# Patient Record
Sex: MALE | Race: WHITE | NOT HISPANIC OR LATINO | Employment: FULL TIME | URBAN - METROPOLITAN AREA
[De-identification: names, ages, dates, MRNs, and addresses within clinical notes are randomized per-mention and may not be internally consistent; named-entity substitution may affect disease eponyms.]

---

## 2024-04-15 ENCOUNTER — OFFICE VISIT (OUTPATIENT)
Dept: INTERNAL MEDICINE | Facility: CLINIC | Age: 29
End: 2024-04-15
Payer: COMMERCIAL

## 2024-04-15 VITALS
SYSTOLIC BLOOD PRESSURE: 116 MMHG | BODY MASS INDEX: 22.91 KG/M2 | OXYGEN SATURATION: 95 % | TEMPERATURE: 98 F | WEIGHT: 145.94 LBS | HEIGHT: 67 IN | HEART RATE: 95 BPM | DIASTOLIC BLOOD PRESSURE: 82 MMHG

## 2024-04-15 DIAGNOSIS — L71.8 OCULAR ROSACEA: ICD-10-CM

## 2024-04-15 DIAGNOSIS — Z00.00 ROUTINE GENERAL MEDICAL EXAMINATION AT A HEALTH CARE FACILITY: Primary | ICD-10-CM

## 2024-04-15 DIAGNOSIS — Z23 NEED FOR DIPHTHERIA-TETANUS-PERTUSSIS (TDAP) VACCINE: ICD-10-CM

## 2024-04-15 PROBLEM — L71.9 ROSACEA: Status: ACTIVE | Noted: 2024-04-15

## 2024-04-15 PROCEDURE — 90471 IMMUNIZATION ADMIN: CPT | Mod: S$GLB,,, | Performed by: INTERNAL MEDICINE

## 2024-04-15 PROCEDURE — 99385 PREV VISIT NEW AGE 18-39: CPT | Mod: 25,S$GLB,, | Performed by: INTERNAL MEDICINE

## 2024-04-15 PROCEDURE — 99999 PR PBB SHADOW E&M-NEW PATIENT-LVL IV: CPT | Mod: PBBFAC,,, | Performed by: INTERNAL MEDICINE

## 2024-04-15 PROCEDURE — 90715 TDAP VACCINE 7 YRS/> IM: CPT | Mod: S$GLB,,, | Performed by: INTERNAL MEDICINE

## 2024-04-15 RX ORDER — LIFITEGRAST 50 MG/ML
SOLUTION/ DROPS OPHTHALMIC 2 TIMES DAILY
COMMUNITY

## 2024-04-15 NOTE — PROGRESS NOTES
"Subjective:      Patient ID: Juan Antonio Barillas is a 29 y.o. male.    Chief Complaint: Establish Care    HPI      29 y.o. with  Patient Active Problem List   Diagnosis    Ocular rosacea    Routine general medical examination at a health care facility     Past Medical History:   Diagnosis Date    Anxiety     Ocular rosacea        Here today for annual prev exam.  Compliant with meds without significant side effects. Energy and appetite are good.     Has several moles that he would like checked.         No past surgical history on file.  Social History     Socioeconomic History    Marital status: Single   Tobacco Use    Smoking status: Never    Smokeless tobacco: Never     family history includes Diabetes in his father and mother; Heart disease in his paternal grandfather; Stroke in his paternal grandmother.  Review of Systems   Constitutional:  Negative for chills and fever.   HENT:  Negative for ear pain and sore throat.    Respiratory:  Negative for cough.    Cardiovascular:  Negative for chest pain.   Gastrointestinal:  Negative for abdominal pain and blood in stool.   Genitourinary:  Negative for dysuria and hematuria.   Neurological:  Negative for seizures and syncope.     Objective:   /82 (BP Location: Left arm, Patient Position: Sitting, BP Method: Large (Manual))   Pulse 95   Temp 97.5 °F (36.4 °C) (Tympanic)   Ht 5' 7" (1.702 m)   Wt 66.2 kg (145 lb 15.1 oz)   SpO2 95%   BMI 22.86 kg/m²     Physical Exam  Constitutional:       General: He is not in acute distress.     Appearance: He is well-developed.   HENT:      Head: Normocephalic and atraumatic.   Eyes:      Extraocular Movements: Extraocular movements intact.   Neck:      Thyroid: No thyromegaly.   Cardiovascular:      Rate and Rhythm: Normal rate and regular rhythm.   Pulmonary:      Breath sounds: Normal breath sounds. No wheezing or rales.   Abdominal:      General: Bowel sounds are normal.      Palpations: Abdomen is soft.      Tenderness: " "There is no abdominal tenderness.   Musculoskeletal:         General: No swelling.      Cervical back: Neck supple. No rigidity.   Lymphadenopathy:      Cervical: No cervical adenopathy.   Skin:     General: Skin is warm and dry.   Neurological:      Mental Status: He is alert and oriented to person, place, and time.   Psychiatric:         Behavior: Behavior normal.         No results found for: "WBC", "HGB", "HCT", "MCV", "PLT", "CHOL", "TRIG", "HDL", "LDLCALC", "ALT", "AST", "NA", "K", "CALCIUM", "CL", "CO2", "BUN", "CREATININE", "EGFRNORACEVR", "TSH", "PSA", "PSADIAG", "GLU", "HGBA1C", "MICROALBUR", "[id number]", "TOTALTESTOST", "BNP"       The ASCVD Risk score (Devon DK, et al., 2019) failed to calculate for the following reasons:    The 2019 ASCVD risk score is only valid for ages 40 to 79     Assessment:     1. Routine general medical examination at a health care facility    2. Ocular rosacea    3. Need for diphtheria-tetanus-pertussis (Tdap) vaccine      Plan:   1. Routine general medical examination at a health care facility  Overview:  Heart healthy diet, regular exercise, and regular use of sunscreen.    reviewed      Orders:  -     Comprehensive Metabolic Panel; Future; Expected date: 04/15/2024  -     CBC Auto Differential; Future; Expected date: 04/15/2024  -     TSH; Future; Expected date: 04/15/2024  -     Lipid Panel; Future; Expected date: 04/15/2024  -     Hepatitis C Antibody; Future; Expected date: 04/15/2024  -     HIV 1/2 Ag/Ab (4th Gen); Future; Expected date: 04/15/2024  -     Hemoglobin A1C; Future; Expected date: 04/15/2024  -     Ambulatory referral/consult to Dermatology; Future; Expected date: 04/22/2024    2. Ocular rosacea    3. Need for diphtheria-tetanus-pertussis (Tdap) vaccine  -     Tdap vaccine injection 0.5 mL        There are no Patient Instructions on file for this visit.    No future appointments.        Follow up in about 1 year (around 4/15/2025), or if symptoms worsen " or fail to improve, for fasting labs sturday.

## 2024-04-16 ENCOUNTER — LAB VISIT (OUTPATIENT)
Dept: LAB | Facility: HOSPITAL | Age: 29
End: 2024-04-16
Attending: INTERNAL MEDICINE
Payer: COMMERCIAL

## 2024-04-16 DIAGNOSIS — Z00.00 ROUTINE GENERAL MEDICAL EXAMINATION AT A HEALTH CARE FACILITY: ICD-10-CM

## 2024-04-16 LAB
ALBUMIN SERPL BCP-MCNC: 4.6 G/DL (ref 3.5–5.2)
ALP SERPL-CCNC: 78 U/L (ref 55–135)
ALT SERPL W/O P-5'-P-CCNC: 24 U/L (ref 10–44)
ANION GAP SERPL CALC-SCNC: 8 MMOL/L (ref 8–16)
AST SERPL-CCNC: 19 U/L (ref 10–40)
BASOPHILS # BLD AUTO: 0.04 K/UL (ref 0–0.2)
BASOPHILS NFR BLD: 0.5 % (ref 0–1.9)
BILIRUB SERPL-MCNC: 0.6 MG/DL (ref 0.1–1)
BUN SERPL-MCNC: 10 MG/DL (ref 6–20)
CALCIUM SERPL-MCNC: 9.8 MG/DL (ref 8.7–10.5)
CHLORIDE SERPL-SCNC: 106 MMOL/L (ref 95–110)
CHOLEST SERPL-MCNC: 220 MG/DL (ref 120–199)
CHOLEST/HDLC SERPL: 5.9 {RATIO} (ref 2–5)
CO2 SERPL-SCNC: 26 MMOL/L (ref 23–29)
CREAT SERPL-MCNC: 0.8 MG/DL (ref 0.5–1.4)
DIFFERENTIAL METHOD BLD: NORMAL
EOSINOPHIL # BLD AUTO: 0.1 K/UL (ref 0–0.5)
EOSINOPHIL NFR BLD: 1.1 % (ref 0–8)
ERYTHROCYTE [DISTWIDTH] IN BLOOD BY AUTOMATED COUNT: 13.2 % (ref 11.5–14.5)
EST. GFR  (NO RACE VARIABLE): >60 ML/MIN/1.73 M^2
ESTIMATED AVG GLUCOSE: 103 MG/DL (ref 68–131)
GLUCOSE SERPL-MCNC: 83 MG/DL (ref 70–110)
HBA1C MFR BLD: 5.2 % (ref 4–5.6)
HCT VFR BLD AUTO: 47.8 % (ref 40–54)
HDLC SERPL-MCNC: 37 MG/DL (ref 40–75)
HDLC SERPL: 16.8 % (ref 20–50)
HGB BLD-MCNC: 16 G/DL (ref 14–18)
IMM GRANULOCYTES # BLD AUTO: 0.02 K/UL (ref 0–0.04)
IMM GRANULOCYTES NFR BLD AUTO: 0.3 % (ref 0–0.5)
LDLC SERPL CALC-MCNC: 146.6 MG/DL (ref 63–159)
LYMPHOCYTES # BLD AUTO: 2.6 K/UL (ref 1–4.8)
LYMPHOCYTES NFR BLD: 35.7 % (ref 18–48)
MCH RBC QN AUTO: 28.7 PG (ref 27–31)
MCHC RBC AUTO-ENTMCNC: 33.5 G/DL (ref 32–36)
MCV RBC AUTO: 86 FL (ref 82–98)
MONOCYTES # BLD AUTO: 0.4 K/UL (ref 0.3–1)
MONOCYTES NFR BLD: 6 % (ref 4–15)
NEUTROPHILS # BLD AUTO: 4.1 K/UL (ref 1.8–7.7)
NEUTROPHILS NFR BLD: 56.4 % (ref 38–73)
NONHDLC SERPL-MCNC: 183 MG/DL
NRBC BLD-RTO: 0 /100 WBC
PLATELET # BLD AUTO: 376 K/UL (ref 150–450)
PMV BLD AUTO: 9.4 FL (ref 9.2–12.9)
POTASSIUM SERPL-SCNC: 4.5 MMOL/L (ref 3.5–5.1)
PROT SERPL-MCNC: 8.3 G/DL (ref 6–8.4)
RBC # BLD AUTO: 5.58 M/UL (ref 4.6–6.2)
SODIUM SERPL-SCNC: 140 MMOL/L (ref 136–145)
TRIGL SERPL-MCNC: 182 MG/DL (ref 30–150)
TSH SERPL DL<=0.005 MIU/L-ACNC: 1.72 UIU/ML (ref 0.4–4)
WBC # BLD AUTO: 7.31 K/UL (ref 3.9–12.7)

## 2024-04-16 PROCEDURE — 85025 COMPLETE CBC W/AUTO DIFF WBC: CPT | Performed by: INTERNAL MEDICINE

## 2024-04-16 PROCEDURE — 80061 LIPID PANEL: CPT | Performed by: INTERNAL MEDICINE

## 2024-04-16 PROCEDURE — 86803 HEPATITIS C AB TEST: CPT | Performed by: INTERNAL MEDICINE

## 2024-04-16 PROCEDURE — 36415 COLL VENOUS BLD VENIPUNCTURE: CPT | Performed by: INTERNAL MEDICINE

## 2024-04-16 PROCEDURE — 87389 HIV-1 AG W/HIV-1&-2 AB AG IA: CPT | Performed by: INTERNAL MEDICINE

## 2024-04-16 PROCEDURE — 83036 HEMOGLOBIN GLYCOSYLATED A1C: CPT | Performed by: INTERNAL MEDICINE

## 2024-04-16 PROCEDURE — 80053 COMPREHEN METABOLIC PANEL: CPT | Performed by: INTERNAL MEDICINE

## 2024-04-16 PROCEDURE — 84443 ASSAY THYROID STIM HORMONE: CPT | Performed by: INTERNAL MEDICINE

## 2024-04-17 LAB
HCV AB SERPL QL IA: NORMAL
HIV 1+2 AB+HIV1 P24 AG SERPL QL IA: NORMAL

## 2024-05-31 ENCOUNTER — PATIENT MESSAGE (OUTPATIENT)
Dept: RESEARCH | Facility: HOSPITAL | Age: 29
End: 2024-05-31
Payer: COMMERCIAL

## 2024-06-11 ENCOUNTER — OFFICE VISIT (OUTPATIENT)
Dept: DERMATOLOGY | Facility: CLINIC | Age: 29
End: 2024-06-11
Payer: COMMERCIAL

## 2024-06-11 VITALS — WEIGHT: 145.94 LBS | BODY MASS INDEX: 22.91 KG/M2 | HEIGHT: 67 IN

## 2024-06-11 DIAGNOSIS — D22.9 MULTIPLE BENIGN NEVI: ICD-10-CM

## 2024-06-11 DIAGNOSIS — D18.01 CHERRY ANGIOMA: Primary | ICD-10-CM

## 2024-06-11 DIAGNOSIS — Z12.83 SCREENING, MALIGNANT NEOPLASM, SKIN: ICD-10-CM

## 2024-06-11 DIAGNOSIS — Z00.00 ROUTINE GENERAL MEDICAL EXAMINATION AT A HEALTH CARE FACILITY: ICD-10-CM

## 2024-06-11 PROCEDURE — 99203 OFFICE O/P NEW LOW 30 MIN: CPT | Mod: S$GLB,,, | Performed by: STUDENT IN AN ORGANIZED HEALTH CARE EDUCATION/TRAINING PROGRAM

## 2024-06-11 PROCEDURE — 99999 PR PBB SHADOW E&M-EST. PATIENT-LVL III: CPT | Mod: PBBFAC,,, | Performed by: STUDENT IN AN ORGANIZED HEALTH CARE EDUCATION/TRAINING PROGRAM

## 2024-06-11 NOTE — PROGRESS NOTES
Subjective:       Patient ID:  Juan Antonio Barillas is a 29 y.o. male who presents for   Chief Complaint   Patient presents with    Skin Check    Spot     Pt visit for small red spot bilateral biceps      History of Present Illness: The patient presents with chief complaint of newly developing red bumps on the skin.  Location: on the upper body  Duration: noticed developed months ago, and has seen more recently  Signs/Symptoms: small red bumps. Denies any pain, bleeding, rapid growth, itching or other symptoms  Prior treatments: none          Review of Systems   Constitutional:  Negative for fever and chills.   Skin:  Negative for itching, rash and dry skin.        Objective:    Physical Exam   Constitutional: He appears well-developed and well-nourished. No distress.   Neurological: He is alert and oriented to person, place, and time. He is not disoriented.   Psychiatric: He has a normal mood and affect.   Skin:   Areas Examined (abnormalities noted in diagram):   Head / Face Inspection Performed  Neck Inspection Performed  Chest / Axilla Inspection Performed  Abdomen Inspection Performed  Back Inspection Performed  RUE Inspected  LUE Inspection Performed                   Diagram Legend     Erythematous scaling macule/papule c/w actinic keratosis       Vascular papule c/w angioma      Pigmented verrucoid papule/plaque c/w seborrheic keratosis      Yellow umbilicated papule c/w sebaceous hyperplasia      Irregularly shaped tan macule c/w lentigo     1-2 mm smooth white papules consistent with Milia      Movable subcutaneous cyst with punctum c/w epidermal inclusion cyst      Subcutaneous movable cyst c/w pilar cyst      Firm pink to brown papule c/w dermatofibroma      Pedunculated fleshy papule(s) c/w skin tag(s)      Evenly pigmented macule c/w junctional nevus     Mildly variegated pigmented, slightly irregular-bordered macule c/w mildly atypical nevus      Flesh colored to evenly pigmented papule c/w intradermal nevus        Pink pearly papule/plaque c/w basal cell carcinoma      Erythematous hyperkeratotic cursted plaque c/w SCC      Surgical scar with no sign of skin cancer recurrence      Open and closed comedones      Inflammatory papules and pustules      Verrucoid papule consistent consistent with wart     Erythematous eczematous patches and plaques     Dystrophic onycholytic nail with subungual debris c/w onychomycosis     Umbilicated papule    Erythematous-base heme-crusted tan verrucoid plaque consistent with inflamed seborrheic keratosis     Erythematous Silvery Scaling Plaque c/w Psoriasis     See annotation      Assessment / Plan:        Cherry angioma  This is a benign vascular lesion. Reassurance given. No treatment required.     Multiple benign nevi  Screening, malignant neoplasm, skin  upper body skin examination performed today including at least 6 points as noted in physical examination. No lesions suspicious for malignancy noted.  Reassurance provided.    Instructed patient to observe lesion(s) for changes and follow up in clinic if changes are noted. Patient to monitor skin at home for new or changing lesions and follow up in clinic if noted.    Discussed ABCDE's of moles and brochure provided.             Follow up in about 1 year (around 6/11/2025), or if symptoms worsen or fail to improve.

## 2024-07-15 PROBLEM — Z00.00 ROUTINE GENERAL MEDICAL EXAMINATION AT A HEALTH CARE FACILITY: Status: RESOLVED | Noted: 2024-04-15 | Resolved: 2024-07-15

## 2025-04-15 ENCOUNTER — LAB VISIT (OUTPATIENT)
Dept: LAB | Facility: HOSPITAL | Age: 30
End: 2025-04-15
Attending: INTERNAL MEDICINE
Payer: COMMERCIAL

## 2025-04-15 ENCOUNTER — OFFICE VISIT (OUTPATIENT)
Dept: INTERNAL MEDICINE | Facility: CLINIC | Age: 30
End: 2025-04-15
Payer: COMMERCIAL

## 2025-04-15 VITALS
HEART RATE: 67 BPM | TEMPERATURE: 97 F | HEIGHT: 67 IN | OXYGEN SATURATION: 99 % | DIASTOLIC BLOOD PRESSURE: 76 MMHG | SYSTOLIC BLOOD PRESSURE: 112 MMHG | BODY MASS INDEX: 22.91 KG/M2 | WEIGHT: 145.94 LBS

## 2025-04-15 DIAGNOSIS — Z00.00 ROUTINE GENERAL MEDICAL EXAMINATION AT A HEALTH CARE FACILITY: ICD-10-CM

## 2025-04-15 DIAGNOSIS — Z00.00 ROUTINE GENERAL MEDICAL EXAMINATION AT A HEALTH CARE FACILITY: Primary | ICD-10-CM

## 2025-04-15 LAB
ABSOLUTE EOSINOPHIL (OHS): 0.12 K/UL
ABSOLUTE MONOCYTE (OHS): 0.38 K/UL (ref 0.3–1)
ABSOLUTE NEUTROPHIL COUNT (OHS): 2.7 K/UL (ref 1.8–7.7)
ALBUMIN SERPL BCP-MCNC: 4.1 G/DL (ref 3.5–5.2)
ALP SERPL-CCNC: 70 UNIT/L (ref 40–150)
ALT SERPL W/O P-5'-P-CCNC: 24 UNIT/L (ref 10–44)
ANION GAP (OHS): 7 MMOL/L (ref 8–16)
AST SERPL-CCNC: 22 UNIT/L (ref 11–45)
BASOPHILS # BLD AUTO: 0.05 K/UL
BASOPHILS NFR BLD AUTO: 0.7 %
BILIRUB SERPL-MCNC: 0.6 MG/DL (ref 0.1–1)
BUN SERPL-MCNC: 7 MG/DL (ref 6–20)
CALCIUM SERPL-MCNC: 9.3 MG/DL (ref 8.7–10.5)
CHLORIDE SERPL-SCNC: 106 MMOL/L (ref 95–110)
CHOLEST SERPL-MCNC: 202 MG/DL (ref 120–199)
CHOLEST/HDLC SERPL: 6.1 {RATIO} (ref 2–5)
CO2 SERPL-SCNC: 26 MMOL/L (ref 23–29)
CREAT SERPL-MCNC: 0.8 MG/DL (ref 0.5–1.4)
EAG (OHS): 100 MG/DL (ref 68–131)
ERYTHROCYTE [DISTWIDTH] IN BLOOD BY AUTOMATED COUNT: 12.3 % (ref 11.5–14.5)
GFR SERPLBLD CREATININE-BSD FMLA CKD-EPI: >60 ML/MIN/1.73/M2
GLUCOSE SERPL-MCNC: 83 MG/DL (ref 70–110)
HBA1C MFR BLD: 5.1 % (ref 4–5.6)
HCT VFR BLD AUTO: 44.2 % (ref 40–54)
HDLC SERPL-MCNC: 33 MG/DL (ref 40–75)
HDLC SERPL: 16.3 % (ref 20–50)
HGB BLD-MCNC: 14.8 GM/DL (ref 14–18)
IMM GRANULOCYTES # BLD AUTO: 0.07 K/UL (ref 0–0.04)
IMM GRANULOCYTES NFR BLD AUTO: 1 % (ref 0–0.5)
LDLC SERPL CALC-MCNC: 132.6 MG/DL (ref 63–159)
LYMPHOCYTES # BLD AUTO: 3.75 K/UL (ref 1–4.8)
MCH RBC QN AUTO: 29.1 PG (ref 27–31)
MCHC RBC AUTO-ENTMCNC: 33.5 G/DL (ref 32–36)
MCV RBC AUTO: 87 FL (ref 82–98)
NONHDLC SERPL-MCNC: 169 MG/DL
NUCLEATED RBC (/100WBC) (OHS): 0 /100 WBC
PLATELET # BLD AUTO: 358 K/UL (ref 150–450)
PMV BLD AUTO: 9.2 FL (ref 9.2–12.9)
POTASSIUM SERPL-SCNC: 4.3 MMOL/L (ref 3.5–5.1)
PROT SERPL-MCNC: 7.6 GM/DL (ref 6–8.4)
RBC # BLD AUTO: 5.09 M/UL (ref 4.6–6.2)
RELATIVE EOSINOPHIL (OHS): 1.7 %
RELATIVE LYMPHOCYTE (OHS): 53 % (ref 18–48)
RELATIVE MONOCYTE (OHS): 5.4 % (ref 4–15)
RELATIVE NEUTROPHIL (OHS): 38.2 % (ref 38–73)
SODIUM SERPL-SCNC: 139 MMOL/L (ref 136–145)
TRIGL SERPL-MCNC: 182 MG/DL (ref 30–150)
TSH SERPL-ACNC: 1.71 UIU/ML (ref 0.4–4)
WBC # BLD AUTO: 7.07 K/UL (ref 3.9–12.7)

## 2025-04-15 PROCEDURE — 99999 PR PBB SHADOW E&M-EST. PATIENT-LVL III: CPT | Mod: PBBFAC,,, | Performed by: INTERNAL MEDICINE

## 2025-04-15 PROCEDURE — 80061 LIPID PANEL: CPT

## 2025-04-15 PROCEDURE — 85025 COMPLETE CBC W/AUTO DIFF WBC: CPT

## 2025-04-15 PROCEDURE — 84443 ASSAY THYROID STIM HORMONE: CPT

## 2025-04-15 PROCEDURE — 83036 HEMOGLOBIN GLYCOSYLATED A1C: CPT

## 2025-04-15 PROCEDURE — 99395 PREV VISIT EST AGE 18-39: CPT | Mod: S$GLB,,, | Performed by: INTERNAL MEDICINE

## 2025-04-15 PROCEDURE — 80053 COMPREHEN METABOLIC PANEL: CPT

## 2025-04-15 PROCEDURE — 36415 COLL VENOUS BLD VENIPUNCTURE: CPT

## 2025-04-15 NOTE — PROGRESS NOTES
"Subjective:      Patient ID: Juan Antonio Barillas is a 30 y.o. male.    Chief Complaint: Annual Exam    HPI  History of Present Illness                 30 y.o. with  Problem List[1]  Past Medical History:   Diagnosis Date    Anxiety     Ocular rosacea        Here today for annual prev exam.  Compliant with meds without significant side effects. Energy and appetite are good.     Declined flu vaccine.         History reviewed. No pertinent surgical history.  Social History[2]  family history includes Diabetes in his father and mother; Heart disease in his paternal grandfather; Stroke in his paternal grandmother.    Review of Systems   Constitutional:  Negative for chills and fever.   HENT:  Negative for ear pain and sore throat.    Respiratory:  Negative for cough.    Cardiovascular:  Negative for chest pain.   Gastrointestinal:  Negative for abdominal pain and blood in stool.   Genitourinary:  Negative for dysuria and hematuria.   Neurological:  Negative for seizures and syncope.     Objective:   /76 (BP Location: Left arm, Patient Position: Sitting)   Pulse 67   Temp 97.1 °F (36.2 °C)   Ht 5' 7" (1.702 m)   Wt 66.2 kg (145 lb 15.1 oz)   SpO2 99%   BMI 22.86 kg/m²     Physical Exam  Constitutional:       General: He is not in acute distress.     Appearance: He is well-developed.   HENT:      Head: Normocephalic and atraumatic.   Eyes:      Extraocular Movements: Extraocular movements intact.   Neck:      Thyroid: No thyromegaly.   Cardiovascular:      Rate and Rhythm: Normal rate and regular rhythm.   Pulmonary:      Breath sounds: Normal breath sounds. No wheezing or rales.   Abdominal:      General: Bowel sounds are normal.      Palpations: Abdomen is soft.      Tenderness: There is no abdominal tenderness.   Musculoskeletal:         General: No swelling.      Cervical back: Neck supple. No rigidity.   Lymphadenopathy:      Cervical: No cervical adenopathy.   Skin:     General: Skin is warm and dry. "   Neurological:      Mental Status: He is alert and oriented to person, place, and time.   Psychiatric:         Behavior: Behavior normal.         Lab Results   Component Value Date    WBC 7.07 04/15/2025    HGB 14.8 04/15/2025    HGB 16.0 04/16/2024    HCT 44.2 04/15/2025    MCV 87 04/15/2025    MCV 86 04/16/2024     04/15/2025    CHOL 202 (H) 04/15/2025    TRIG 182 (H) 04/15/2025    HDL 33 (L) 04/15/2025    LDLCALC 146.6 04/16/2024    ALT 24 04/15/2025    AST 22 04/15/2025     04/15/2025    K 4.3 04/15/2025    CALCIUM 9.3 04/15/2025     04/15/2025    CO2 26 04/15/2025    BUN 7 04/15/2025    CREATININE 0.8 04/15/2025    CREATININE 0.8 04/16/2024    EGFRNORACEVR >60 04/15/2025    EGFRNORACEVR >60.0 04/16/2024    TSH 1.709 04/15/2025    TSH 1.721 04/16/2024    GLU 83 04/16/2024    HGBA1C 5.1 04/15/2025    HGBA1C 5.2 04/16/2024          The ASCVD Risk score (Devon DK, et al., 2019) failed to calculate for the following reasons:    The 2019 ASCVD risk score is only valid for ages 40 to 79     Assessment:     1. Routine general medical examination at a health care facility      Plan:   1. Routine general medical examination at a health care facility  -     Comprehensive Metabolic Panel; Future; Expected date: 04/15/2025  -     CBC Auto Differential; Future; Expected date: 04/15/2025  -     TSH; Future; Expected date: 04/15/2025  -     Lipid Panel; Future; Expected date: 04/15/2025  -     Hemoglobin A1C; Future; Expected date: 04/15/2025    Heart healthy diet, regular exercise, and regular use of sunscreen.   HM reviewed    There are no Patient Instructions on file for this visit.    Future Appointments   Date Time Provider Department Center   4/15/2026  7:40 AM Juan Francisco Bailey MD [facility] up in about 1 year (around 4/15/2026), or if symptoms worsen or fail to improve, for labs today.                  [1]   Patient Active Problem List  Diagnosis    Ocular rosacea   [2]    Social History  Socioeconomic History    Marital status: Single   Tobacco Use    Smoking status: Never    Smokeless tobacco: Never